# Patient Record
Sex: FEMALE | Race: WHITE | NOT HISPANIC OR LATINO | Employment: PART TIME | URBAN - METROPOLITAN AREA
[De-identification: names, ages, dates, MRNs, and addresses within clinical notes are randomized per-mention and may not be internally consistent; named-entity substitution may affect disease eponyms.]

---

## 2025-01-12 ENCOUNTER — ANCILLARY PROCEDURE (OUTPATIENT)
Dept: URGENT CARE | Age: 35
End: 2025-01-12
Payer: COMMERCIAL

## 2025-01-12 ENCOUNTER — OFFICE VISIT (OUTPATIENT)
Dept: URGENT CARE | Age: 35
End: 2025-01-12
Payer: COMMERCIAL

## 2025-01-12 VITALS
HEART RATE: 100 BPM | RESPIRATION RATE: 16 BRPM | TEMPERATURE: 102.5 F | DIASTOLIC BLOOD PRESSURE: 89 MMHG | SYSTOLIC BLOOD PRESSURE: 126 MMHG | OXYGEN SATURATION: 98 %

## 2025-01-12 DIAGNOSIS — J18.9 PNEUMONIA OF RIGHT LOWER LOBE DUE TO INFECTIOUS ORGANISM: Primary | ICD-10-CM

## 2025-01-12 DIAGNOSIS — J06.9 VIRAL URI: ICD-10-CM

## 2025-01-12 DIAGNOSIS — R05.1 ACUTE COUGH: ICD-10-CM

## 2025-01-12 DIAGNOSIS — R68.89 FLU-LIKE SYMPTOMS: ICD-10-CM

## 2025-01-12 LAB
POC RAPID INFLUENZA A: NEGATIVE
POC RAPID INFLUENZA B: NEGATIVE
POC SARS-COV-2 AG BINAX: NORMAL

## 2025-01-12 PROCEDURE — 71046 X-RAY EXAM CHEST 2 VIEWS: CPT | Performed by: PHYSICIAN ASSISTANT

## 2025-01-12 PROCEDURE — 99213 OFFICE O/P EST LOW 20 MIN: CPT | Performed by: PHYSICIAN ASSISTANT

## 2025-01-12 PROCEDURE — 87811 SARS-COV-2 COVID19 W/OPTIC: CPT | Performed by: PHYSICIAN ASSISTANT

## 2025-01-12 PROCEDURE — 87804 INFLUENZA ASSAY W/OPTIC: CPT | Performed by: PHYSICIAN ASSISTANT

## 2025-01-12 RX ORDER — AZITHROMYCIN 250 MG/1
TABLET, FILM COATED ORAL
Qty: 6 TABLET | Refills: 0 | Status: SHIPPED | OUTPATIENT
Start: 2025-01-12 | End: 2025-01-17

## 2025-01-12 RX ORDER — AMOXICILLIN AND CLAVULANATE POTASSIUM 875; 125 MG/1; MG/1
1 TABLET, FILM COATED ORAL 2 TIMES DAILY
Qty: 14 TABLET | Refills: 0 | Status: SHIPPED | OUTPATIENT
Start: 2025-01-12 | End: 2025-01-19

## 2025-01-12 NOTE — PATIENT INSTRUCTIONS
"Pneumonia       plan:  1. Discharge home.  2. Take medication as directed: Augmentin  Azithromycin (Zithromax) 250 mg tablet  Take 2 tablets on day 1, followed by 1 tablet on days 2-5  3. Follow up with your family doctor in 2-3 days for reevaluation.  4. Return to ED immediately if symptoms worsen or if new symptoms develop.    Supportive care  Supportive care includes fluids, antipyretics, analgesics, and, for patients with hypoxemia, oxygen.       Pneumonia:    Community-acquired pneumonia is lung infection that develops in people outside a hospital.   ° Many bacteria, viruses, and fungi can cause pneumonia.  ° The most common symptom of pneumonia is a cough that produces sputum, but chest pain, chills, fever, and shortness of breath are also common.  ° Doctors diagnose community-acquired pneumonia by listening to the lungs with a stethoscope and by reading x-rays or computed tomography (CT) scans of the chest.  ° Antibiotics, antiviral drugs, or antifungal drugs are used depending on which organism doctors believe has caused the pneumonia.      Causes   Many organisms cause community-acquired pneumonia, including bacteria, viruses, fungi, and parasites. In most cases, the specific microorganism causing the pneumonia is not identified. However, doctors can usually predict which microorganisms are most likely to be causing the pneumonia based on the person´s age and other factors, such as whether the person also has other diseases. The term community-acquired pneumonia is usually reserved for people who have pneumonia caused by one of the more common bacteria or viruses.    \"Walking pneumonia\" is a nonmedical term used to describe a mild case of community-acquired pneumonia that does not require bedrest or hospitalization. Some people even feel well enough to go to work and participate in other daily activities. The cause is often a viral lung infection or a bacterial infection with Mycoplasma pneumoniae or " Chlamydophila pneumoniae.    Symptoms of community-acquired pneumonia includ  ° A general feeling of weakness (malaise)  ° Cough  ° Shortness of breath  ° Fever  ° Chills  ° Chest pain      Cough typically produces sputum (thick or discolored mucus) in older children and adults, but the cough is dry in infants, young children, and older people. Shortness of breath usually is mild and occurs mainly during activity. Chest pain is typically worse when breathing in or coughing. Sometimes people have upper abdominal pain.     Symptoms vary at the extremes of age. Symptoms of pneumonia in infants and toddlers may include irritability and restlessness. Older people may be confused or have a decreased level of consciousness. Older people and young children may be unable to communicate chest pain and shortness of breath. Fever is common but may not occur in older people.    Diagnosis   ° A doctor's examination  ° Usually a chest x-ray or computed tomography (CT) scan of the chest      No matter what type of pneumonia is suspected, doctors listen to a person´s chest with a stethoscope to make a diagnosis. Chest x-rays or CT scans are usually also done to confirm the diagnosis.     Doctors usually do not need to do additional tests to determine what organism is causing the pneumonia, because most organisms are effectively treated by standard treatment and identifying the organism does not make a difference in treatment success. However, if doctors do need to identify the organism, they usually try to grow the organism from a specimen of sputum, blood, or urine. Even when such testing is done, the organism is identified less than half the time.     Sometimes the person's symptoms or risk factors may suggest the cause of the pneumonia. For example, a bird fancier may have psittacosis. Certain combinations of risk factors and symptoms may suggest Legionella infection. In people who have first had symptoms typical of influenza or  of chickenpox, most pneumonia that develops is actually a bacterial pneumonia that took root in the lungs inflamed by the virus. However, sometimes influenza and chickenpox cause pneumonia directly.    Prognosis   Most people with community-acquired pneumonia recover. However, pneumonia can be fatal, most often in infants and in older people. The death rate is higher in Legionella infections, possibly because people who develop the disease are less healthy even before they become sick.    Prevention   Stopping smoking is the best way to prevent pneumonia.    Some pneumonias can be prevented by vaccination. Unvaccinated people who are at high risk of severe pneumonia and who are in close contact with a person who has influenza can be given the antiviral drugs oseltamivir or zanamivir to prevent influenza.    Treatment   ° Antibiotics  ° Sometimes antiviral drugs or antifungal drugs      Doctors evaluate many factors to determine whether people can be safely treated at home or whether they should be hospitalized because of high risk of complications. Some of the factors include the following:    ° Age  ° Whether another disorder, such as cancer or a liver, heart, or lung disorder, is also present  ° Whether there are worrisome findings on physical examination or testing  ° Whether people are able to care for themselves or have someone to help them      Antibiotics are started as soon as possible. Doctors may prescribe antibiotics based on the severity of the infection and the risk of complications (see table How Is Community-Acquired Pneumonia Treated?). People are also given fluids, drugs to relieve fever and pain, and oxygen if needed.     Because the causative organism is difficult to identify, doctors choose antibiotics based on the organisms that are most likely to be causing pneumonia and the severity of illness.     With antibiotic treatment, most people with bacterial pneumonia improve. In people who do not  improve, doctors look for an alternative diagnosis, unusual organisms, resistance to the antibiotic used for treatment, infection with a second organism, spread of the infection beyond the lung (for example, into the lining of the lung [pleura]), or some other disorder (such as a problem with the immune system or a blocked (obstructed) airway) that is delaying recovery.     To treat influenza pneumonia, the antiviral drugs oseltamivir or zanamivir can be given. To treat chickenpox pneumonia, acyclovir is given. If a person with a presumed viral pneumonia is very sick or does not improve within a few days after beginning treatment, doctors may prescribe antibiotics in case bacteria have also infected the lung.          Pediatric pneumonia:    1-Mild/moderate community-acquired pneumonia suspected bacterial in origin  2-Consider inpatient therapy if respiratory distress, hypoxemia, and adequate follow-up, not tolerating oral therapy    First-line therapy-amoxicillin 90 mg/kg/day divided twice daily (max: 2000 mg/dose)  >5 years old consider adding coverage for atypical pneumonia.  Azithromycin 10 mg/kg/day 1 (max 500 mg/dose) followed by 5 mg/kg days 2-5 (max to 50 mg/dose)    Second line or failure-Augmentin-90 mg/kg/day divided twice daily (max 2000 mg/dose)    Beta-lactam allergy:  First-line-clindamycin 40 mg/kg/day divided 3 times daily (max 600 mg/dose) x10 days  Second line-clindamycin plus either  Cefpodoxime 10 mg/kilogram/day divided twice daily(max: 200 mg/dose) or cefixime 8 mg/kilogram divided twice daily(max 400 mg/dose)    Influenza pneumonia:  Oseltamivir-5 days course for children>12 months  <15 kilogram: 30 mg twice daily  >15 to 23 k mg twice daily  > 23 to 40 k mg twice daily  > 40 k mg twice daily    For children<12 months  1-8 months: 3 mg/kg per dose twice daily  9-11 months: 3.5 mg/kg per dose twice daily

## 2025-01-12 NOTE — PROGRESS NOTES
Subjective   Patient ID: Alisa Royal is a 34 y.o. female. They present today with a chief complaint of Cough and Fever (X 3 days).    CC: Viral symptoms x 3 days    HPI: This is a 34-year-old female patient presenting for viral symptoms.  Symptoms include fever, chills, myalgias and a dry cough.   No chest pain, shortness of breath, abdominal pain, nausea, vomiting.  No history of clots or clotting disorders.  No lower extremity swelling or pain.    Past Medical History  Allergies as of 01/12/2025    (No Known Allergies)       (Not in a hospital admission)       Past Medical History:   Diagnosis Date    Personal history of other diseases of the female genital tract     History of ovarian cyst       Past Surgical History:   Procedure Laterality Date    OTHER SURGICAL HISTORY  12/16/2022    Dilation and curettage            Review of Systems  Review of Systems    After reviewing all body systems I have documented pertinent findings above in the history.  All other Systems reviewed and are negative for complaint.  Pertinent positive and negatives are listed in the above HPI.    Objective    Vitals:    01/12/25 0909   BP: 126/89   Pulse: 100   Resp: 16   Temp: (!) 39.2 °C (102.5 °F)   SpO2: 98%     No LMP recorded.  Physical Exam    General: Alert, oriented, and cooperative.  No acute distress. Well developed, well nourished.     Skin: Skin is warm, and dry. No rashes or lesions.    Eyes: Sclera and conjunctivae normal     Ears: TM´s are intact, grey, with mild effusions bilaterally.  No significant erythema.  No hemotympanum or rupture. Bilateral auricle, helix, and tragus without inflammation or erythema.  No mastoid erythema, or tenderness.  No deformities. Right and left canal negative for erythema, or discharge.  Hearing is grossly intact bilaterally    Mouth/Throat: Pharynx is erythematous.  Tonsils are equal in size.  No exudates.  No angioedema of the lips or tongue.  No respiratory compromise, tripoding,  drooling, muffled voice,  stridor, or trismus.     Neck: Supple.     Cardiac: Regular rate and rhythm    Respiratory:  No acute respiratory distress.  Regular rate of breathing.  No accessory muscle use.  No tripoding.  Lungs are clear bilaterally.    Abdomen: Soft, nontender.    Musculoskeletal: Upper and lower extremities are atraumatic in appearance without deformity, erythema, edema, and atrophy. No crepitus, or tenderness. Compartments are all soft.      Procedures    Point of Care Test & Imaging Results from this visit  Results for orders placed or performed in visit on 01/12/25   POCT Covid-19 Rapid Antigen    Collection Time: 01/12/25  9:10 AM   Result Value Ref Range    POC JONAS-COV-2 AG  Presumptive negative test for SARS-CoV-2 (no antigen detected)     Presumptive negative test for SARS-CoV-2 (no antigen detected)   POCT Influenza A/B manually resulted    Collection Time: 01/12/25  9:39 AM   Result Value Ref Range    POC Rapid Influenza A Negative Negative    POC Rapid Influenza B Negative Negative     XR chest 2 views    Result Date: 1/12/2025  Interpreted By:  Magalys Huertas, STUDY: XR CHEST 2 VIEWS; 1/12/2025 9:51 am   INDICATION: Signs/Symptoms:cough.   COMPARISON: None.   ACCESSION NUMBER(S): [id number]   ORDERING CLINICIAN: BRUCE TAYLOR   FINDINGS: Cardiac silhouette and mediastinum within normal limits. There is round area of opacification projecting over the right lung base suggestive of developing infiltrate/pneumonia. The remainder of the lung fields are clear bilaterally. No effusions or pneumothorax.       1. Right lower lobe pneumonia. Clinical correlation and follow-up recommended to document resolution. If there is clinical concern for acute pulmonary embolic disease, further evaluation with chest CT should be considered.     Signed by: Magalys Huertas 1/12/2025 10:31 AM Dictation workstation:   [id number]     Diagnostic study results (if any) were reviewed by Bruce Taylor  JOSE.    Assessment/Plan   Allergies, medications, history, and pertinent labs/EKGs/Imaging reviewed by Bony Lyle PA-C.     MDM:  Patient presenting for flulike type symptoms.   No reported orthostasis, dizziness, hypotension, mental status change, lethargy, confusion, difficulty in arousing or cyanosis.    Rapid flu/COVID: Negative  Chest x-ray: Right lower lobe pneumonia    Patient is alert and oriented to person, place, time, situation. No acute distress or signs of respiratory compromise.  No tripoding.  No sensory muscle use.  Speaks in complete sentences.  Oral exam is benign. No drooling, angioedema, or lesions. Neck supple. Rhythm regular. No JVD or lower extremity swelling.  No red flags or clinical signs to suggest PE or CHF. No history or concerns for COPD.     Patient does not appear toxic, is <65 years old, without comorbid illness. O2 saturation within normal limits. Pt/family reports good follow up. Patient/family counseled regarding the diagnosis and will be treated as an outpatient with prescribed empiric antibiotic coverage for pneumonia. Pt discharged home d/t good condition.  Pt/family instructed to f/u with PCP within 2-3 days and encouraged/advised to return to ED if symptoms worsen or if new symptoms develop. Pt/family expressed understanding and consented to the above plan. No barriers of communication were apparent and I answered all questions.       Diagnoses  Diagnoses and all orders for this visit:  Pneumonia of right lower lobe due to infectious organism  -     amoxicillin-pot clavulanate (Augmentin) 875-125 mg tablet; Take 1 tablet by mouth 2 times a day for 7 days.  -     azithromycin (Zithromax) 250 mg tablet; Take 2 tabs (500 mg) by mouth today, than 1 daily for 4 days.  Acute cough  -     POCT Covid-19 Rapid Antigen  -     POCT Influenza A/B manually resulted  -     XR chest 2 views; Future  Flu-like symptoms      Medical Admin Record      Patient disposition:  Home    Electronically signed by Bony Lyle PA-C  10:40 AM

## 2025-01-15 ENCOUNTER — TELEPHONE (OUTPATIENT)
Facility: CLINIC | Age: 35
End: 2025-01-15

## 2025-01-15 NOTE — TELEPHONE ENCOUNTER
Per Dr. Delgado continue current treatment,take abx with food , appt if no improvement in 24 hours or ER for worsening sxs.

## 2025-01-15 NOTE — TELEPHONE ENCOUNTER
Patient is not getting better from her pneumonia lower right lobe.  Fritz was diagnosed on 01/12/2025 at an urgent care.  Her  said she is nauseated and she thinks she is going to vomit.  She is taking her antibiotics and her fever is down since she has been taking her meds.  She would like to come in today.

## 2025-03-06 ENCOUNTER — APPOINTMENT (OUTPATIENT)
Dept: OPHTHALMOLOGY | Facility: CLINIC | Age: 35
End: 2025-03-06
Payer: COMMERCIAL

## 2025-03-06 DIAGNOSIS — H02.432 PARALYTIC PTOSIS OF LEFT UPPER EYELID: ICD-10-CM

## 2025-03-06 DIAGNOSIS — H02.431: Primary | ICD-10-CM

## 2025-03-06 DIAGNOSIS — H02.403 PTOSIS OF EYELID, BILATERAL: ICD-10-CM

## 2025-03-06 DIAGNOSIS — H46.9 OPTIC NEUROPATHY, LEFT: ICD-10-CM

## 2025-03-06 PROCEDURE — 99204 OFFICE O/P NEW MOD 45 MIN: CPT

## 2025-03-06 PROCEDURE — 92081 LIMITED VISUAL FIELD XM: CPT

## 2025-03-06 ASSESSMENT — CONF VISUAL FIELD
OD_NORMAL: 1
OD_SUPERIOR_NASAL_RESTRICTION: 0
OD_INFERIOR_NASAL_RESTRICTION: 0
OS_INFERIOR_TEMPORAL_RESTRICTION: 0
OS_NORMAL: 1
OS_INFERIOR_NASAL_RESTRICTION: 0
OS_SUPERIOR_TEMPORAL_RESTRICTION: 0
OD_SUPERIOR_TEMPORAL_RESTRICTION: 0
OD_INFERIOR_TEMPORAL_RESTRICTION: 0
OS_SUPERIOR_NASAL_RESTRICTION: 0

## 2025-03-06 ASSESSMENT — VISUAL ACUITY
OS_CC: 20/20
OD_CC: 20/20
CORRECTION_TYPE: CONTACTS
OD_CC+: -2
METHOD: SNELLEN - LINEAR

## 2025-03-06 ASSESSMENT — TONOMETRY: IOP_METHOD: GOLDMANN APPLANATION

## 2025-03-06 ASSESSMENT — SLIT LAMP EXAM - LIDS
COMMENTS: NORMAL
COMMENTS: NORMAL

## 2025-03-06 ASSESSMENT — ENCOUNTER SYMPTOMS
EYES NEGATIVE: 1
ENDOCRINE NEGATIVE: 0
GASTROINTESTINAL NEGATIVE: 0
CARDIOVASCULAR NEGATIVE: 0
CONSTITUTIONAL NEGATIVE: 0
HEMATOLOGIC/LYMPHATIC NEGATIVE: 0
MUSCULOSKELETAL NEGATIVE: 0
ALLERGIC/IMMUNOLOGIC NEGATIVE: 0
NEUROLOGICAL NEGATIVE: 0
RESPIRATORY NEGATIVE: 0
PSYCHIATRIC NEGATIVE: 0

## 2025-03-06 ASSESSMENT — PUNCTA - ASSESSMENT
OS_PUNCTA: NORMAL
OD_PUNCTA: NORMAL

## 2025-03-06 ASSESSMENT — CUP TO DISC RATIO
OD_RATIO: .15
OS_RATIO: .15

## 2025-03-06 ASSESSMENT — EXTERNAL EXAM - RIGHT EYE: OD_EXAM: NORMAL

## 2025-03-06 ASSESSMENT — EXTERNAL EXAM - LEFT EYE: OS_EXAM: NORMAL

## 2025-03-06 NOTE — PROGRESS NOTES
"Alisa Royal is a 34 y.o. female presenting with complaints of droopy upper eyelids  Reports experiencing longstanding eyelid drooping that has possibly worsened slowly overtime. CL user (toric OD nontoric OS SCLs) since age 12.   Symptoms include   - Not bothered by cosmetic appearance as much as bothered by seeing streaks (sounds like glare when discussed further) with light at nighttime (has been this way for 10-15 years). No afterimages. Glare does not change with or without CL. The glare does however improve with manual lid elevation.   - Dimmer vision is bothersome to her. Improves with manual lid  elevation.  Obscured superior visual field  Heaviness of eyes  Difficulty keeping eyes open  Frequent need to manually lift eyelids  Denies   Trauma  Prior periocular surgeries, injectables, and laser  Denies   Diplopia  Vision loss  Denies dysarthria, dysphonia, dysphagia, SOB, muscular weakness  Smoking status: no        Pertinent Exam Findings  Blepharoptosis of the OD>OS upper eyelid  MRD1 -2 mm OD, -1 mm OS  PF 3 mm OD, 4 mm  OS  LF 10 mm OD, 12 mm OS  SC 11 mm OD, 11 mm OS  EOM full OU; orthophoric; PERRL with no APD and no anisocoria  Brow position good  Phenylephrine testing:  MRD1 improves to -1.5 mm OD, -0.5 mm OS     Ptosis VF Testing  There is a significant obstruction in both eyes, which improves significantly in both eyes after the eyelids are taped up.     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 VA 20/20 OU; PERRL with no anisocoria, EOM full OU, no diplopia, ortho. No pigmentary retinopathy or history of cardiac issues.    Etiologies include levator dehiscence due to CTL-wear and also neurogenic/myogenic causes due to reduced LF OU. Notably, the pt reports her father has a similar issue with ptosis and \"weak eyelids\", although he does not have cardiac issues. Denies bulbar symptoms and diplopia.    If correction desired, pt is a " candidate for bilateral upper eyelid ELR. However, emphasized that due to weakened LF OU, the ptosis may recur with time and additional repair may be necessary in the future, which may need to include frontalis sling surgery (though at this time, ELR is feasible as LF is not too low).    Before proceeding with surgery, workup for neurogenic causes is warranted. This will include labs for MG, creatine kinase and lactate levels, and genetic testing for CPEO and related conditions.    Incidental finding of left optic neuropathy noted today (2+ APD OS, CP 8/8 OU, slight temporal pallor of left optic nerve without edema or heme). Recommended laboratory workup for optic neuropathy, MRI ANGELES WWO, and neuro-ophthalmic evaluation with Dr. Jones.

## 2025-03-06 NOTE — PATIENT INSTRUCTIONS
"Your Doctor Has Ordered a CT or MRI Scan  The imaging department will contact you to schedule the appointment. However, you may also contact them directly at [phone] to schedule your appointment. Please have your scan done before your next appointment with us. If you have your scan done outside of a  facility, please ask for the \"disk\" or \"USB\" and bring that to your next appointment.   "

## 2025-03-18 ENCOUNTER — APPOINTMENT (OUTPATIENT)
Dept: RADIOLOGY | Facility: HOSPITAL | Age: 35
End: 2025-03-18
Payer: COMMERCIAL

## 2025-03-19 ENCOUNTER — APPOINTMENT (OUTPATIENT)
Dept: DERMATOLOGY | Facility: CLINIC | Age: 35
End: 2025-03-19
Payer: COMMERCIAL

## 2025-03-31 ENCOUNTER — APPOINTMENT (OUTPATIENT)
Dept: RADIOLOGY | Facility: HOSPITAL | Age: 35
End: 2025-03-31
Payer: COMMERCIAL

## 2025-03-31 ENCOUNTER — HOSPITAL ENCOUNTER (OUTPATIENT)
Dept: RADIOLOGY | Facility: HOSPITAL | Age: 35
Discharge: HOME | End: 2025-03-31
Payer: COMMERCIAL

## 2025-03-31 VITALS — WEIGHT: 169.97 LBS | BODY MASS INDEX: 29.18 KG/M2

## 2025-03-31 DIAGNOSIS — H02.403 PTOSIS OF EYELID, BILATERAL: ICD-10-CM

## 2025-03-31 DIAGNOSIS — H02.432 PARALYTIC PTOSIS OF LEFT UPPER EYELID: ICD-10-CM

## 2025-03-31 DIAGNOSIS — H02.431: ICD-10-CM

## 2025-03-31 PROCEDURE — 70553 MRI BRAIN STEM W/O & W/DYE: CPT | Performed by: RADIOLOGY

## 2025-03-31 PROCEDURE — A9575 INJ GADOTERATE MEGLUMI 0.1ML: HCPCS

## 2025-03-31 PROCEDURE — 70543 MRI ORBT/FAC/NCK W/O &W/DYE: CPT

## 2025-03-31 PROCEDURE — 2550000001 HC RX 255 CONTRASTS

## 2025-03-31 PROCEDURE — 70543 MRI ORBT/FAC/NCK W/O &W/DYE: CPT | Performed by: RADIOLOGY

## 2025-03-31 PROCEDURE — 70553 MRI BRAIN STEM W/O & W/DYE: CPT

## 2025-03-31 RX ORDER — GADOTERATE MEGLUMINE 376.9 MG/ML
15 INJECTION INTRAVENOUS
Status: COMPLETED | OUTPATIENT
Start: 2025-03-31 | End: 2025-03-31

## 2025-03-31 RX ADMIN — GADOTERATE MEGLUMINE 15 ML: 376.9 INJECTION INTRAVENOUS at 11:07

## 2025-04-03 ENCOUNTER — APPOINTMENT (OUTPATIENT)
Dept: DERMATOLOGY | Facility: CLINIC | Age: 35
End: 2025-04-03
Payer: COMMERCIAL

## 2025-04-06 LAB
ACE SERPL-CCNC: 44 U/L (ref 9–67)
ACHR BIND AB SER-SCNC: <0.3 NMOL/L
ACHR BLOCK AB SER-ACNC: <15 % INHIBITION
ACHR MOD AB SER-ACNC: 20 % INHIBITION
ANA PAT SER IF-IMP: ABNORMAL
ANA SER QL IF: POSITIVE
ANA TITR SER IF: ABNORMAL TITER
ANCA AB SER QL: NEGATIVE
AQP4 H2O CHANNEL IGG SER QL CBA IFA: NEGATIVE
B BURGDOR IGG SER IA-ACNC: <=0.9 INDEX
B BURGDOR IGG+IGM SER QL IA: 1.32 INDEX
B BURGDOR IGM SER IA-ACNC: <=0.9 INDEX
BASOPHILS # BLD AUTO: 29 CELLS/UL (ref 0–200)
BASOPHILS NFR BLD AUTO: 0.5 %
CENTROMERE B AB SER-ACNC: ABNORMAL AI
CK SERPL-CCNC: 240 U/L (ref 20–239)
DSDNA AB SER-ACNC: <1 IU/ML
ENA JO1 AB SER IA-ACNC: ABNORMAL AI
ENA RNP AB SER-ACNC: ABNORMAL AI
ENA SCL70 AB SER IA-ACNC: ABNORMAL AI
ENA SM AB SER IA-ACNC: ABNORMAL AI
ENA SM+RNP AB SER IA-ACNC: ABNORMAL AI
ENA SS-A AB SER IA-ACNC: ABNORMAL AI
ENA SS-B AB SER IA-ACNC: ABNORMAL AI
EOSINOPHIL # BLD AUTO: 103 CELLS/UL (ref 15–500)
EOSINOPHIL NFR BLD AUTO: 1.8 %
ERYTHROCYTE [DISTWIDTH] IN BLOOD BY AUTOMATED COUNT: 12.7 % (ref 11–15)
HCT VFR BLD AUTO: 40 % (ref 35–45)
HGB BLD-MCNC: 13 G/DL (ref 11.7–15.5)
IGNF NEG CNTRL BLD: NORMAL
LABORATORY COMMENT REPORT: ABNORMAL
LACTATE SERPL-MCNC: 1 MMOL/L (ref 0.4–1.8)
LYMPHOCYTES # BLD AUTO: 1955 CELLS/UL (ref 850–3900)
LYMPHOCYTES NFR BLD AUTO: 34.3 %
M TB IFN-G BLD-IMP: NEGATIVE
MCH RBC QN AUTO: 30.5 PG (ref 27–33)
MCHC RBC AUTO-ENTMCNC: 32.5 G/DL (ref 32–36)
MCV RBC AUTO: 93.9 FL (ref 80–100)
METHYLMALONATE SERPL-SCNC: 90 NMOL/L (ref 55–335)
MITOGEN IGNF.SPOT COUNT BLD: NORMAL
MONOCYTES # BLD AUTO: 513 CELLS/UL (ref 200–950)
MONOCYTES NFR BLD AUTO: 9 %
MYELOPEROXIDASE AB SER IA-ACNC: <1 AI
NARRATIVE DIAGNOSTIC REPORT-IMP: NORMAL
NEUTROPHILS # BLD AUTO: 3101 CELLS/UL (ref 1500–7800)
NEUTROPHILS NFR BLD AUTO: 54.4 %
NUCLEOSOME AB SER IA-ACNC: ABNORMAL AI
PLATELET # BLD AUTO: 232 THOUSAND/UL (ref 140–400)
PMV BLD REES-ECKER: 10.5 FL (ref 7.5–12.5)
PROTEINASE3 AB SER IA-ACNC: <1 AI
QUEST PANEL A SPOT COUNT: 0
QUEST PANEL B SPOT COUNT: 0
RBC # BLD AUTO: 4.26 MILLION/UL (ref 3.8–5.1)
REF LAB TEST METHOD: NORMAL
REF LAB TEST RESULTS: NORMAL
RIBOSOMAL P AB SER-ACNC: ABNORMAL AI
SERVICE CMNT 04-IMP: NORMAL
SERVICE CMNT-IMP: NORMAL
T PALLIDUM AB SER QL IA: NEGATIVE
WBC # BLD AUTO: 5.7 THOUSAND/UL (ref 3.8–10.8)

## 2025-04-08 ENCOUNTER — APPOINTMENT (OUTPATIENT)
Dept: OPHTHALMOLOGY | Facility: CLINIC | Age: 35
End: 2025-04-08
Payer: COMMERCIAL

## 2025-04-11 ENCOUNTER — APPOINTMENT (OUTPATIENT)
Dept: RADIOLOGY | Facility: HOSPITAL | Age: 35
End: 2025-04-11
Payer: COMMERCIAL

## 2025-05-02 ENCOUNTER — APPOINTMENT (OUTPATIENT)
Dept: OPHTHALMOLOGY | Facility: CLINIC | Age: 35
End: 2025-05-02
Payer: COMMERCIAL

## 2025-05-05 ENCOUNTER — APPOINTMENT (OUTPATIENT)
Dept: OPHTHALMOLOGY | Age: 35
End: 2025-05-05
Payer: COMMERCIAL

## 2025-05-13 ENCOUNTER — APPOINTMENT (OUTPATIENT)
Dept: OPHTHALMOLOGY | Facility: CLINIC | Age: 35
End: 2025-05-13
Payer: COMMERCIAL

## 2025-05-14 ENCOUNTER — APPOINTMENT (OUTPATIENT)
Dept: OPHTHALMOLOGY | Facility: CLINIC | Age: 35
End: 2025-05-14
Payer: COMMERCIAL

## 2025-06-06 ENCOUNTER — APPOINTMENT (OUTPATIENT)
Dept: OPHTHALMOLOGY | Facility: CLINIC | Age: 35
End: 2025-06-06
Payer: COMMERCIAL

## 2025-06-30 NOTE — PROGRESS NOTES
"MEDICAL GENETICS INITIAL VISIT NOTE     Patient full name: Alisa Royal  MRN: [id number]  YOB: 1990  Present during this visit: The patient     Primary care provider: Amaris Delgado DO  Referring provider: Lashonda Perez MD (Ophthalmology)    Medical history was obtained from the patient. Prior to this appointment, I reviewed medical records from outpatient medical records and scanned documents, if available.     History of present illness:    Dear Dr. Perez:    It was a pleasure to see Ms. Royal in clinic today. Ms. Royal is a 34 y.o. female who was referred to Genetics for bilateral ptosis. She started noticing ptosis at both eyes in college. Symptoms have been slowly progressive during the past 10 years. Due to the ptosis, it is challenging to drive at night. Using contact lens. No other ocular diagnoses. Denies diplopia, oscillopsia, blurry vision. No muscle weakness or bulbar symptoms.     PMH - vertigo attack x1, PCOS   PSH - ovarian cyst sx    Review of systems:  Headaches: -  Hearing loss: -   Facial palsy: -   Strokes: -   Seizures: -   Dysphagia/aspiration: -   Cardiopulmonary symptoms: -   Sleep issues: -   Myotonia: -   Muscle weakness: -   Muscle stiffness: very occasionally   Muscle atrophy: -   Muscle hypertrophy: -   Rhabdomyolysis: -   GI issues: -   Urinary issues: -   Musculoskeletal issues: she is hypermobile. Never had a fracture. Has had joint subluxation at the R shoulder, seen by PT.   Cognitive decline: -   Gait disturbance: -  Fall: -  Paresthesia: -    Neurodevelopmental disorders (GDD, LD, LD, ID): -  Significant childhood illness: -     Social history:  Teaches music. Lives with three children and .     Family history:  Four-generation family tree was obtained and scanned to chart, under \"Genetics\" folder.  Pertinent history   Three children, healthy.   Father, 71, bilateral ptosis, scoliosis, no muscle weakness.    Paternal grandparents (d) Alzheimer's " disease     Ashkenazi Islam: Denied  Consanguinity: Denied     Physical examination:  General: Alert. No acute distress. Well-nourished.  Head and face: No significant dysmorphic craniofacial features. Palate not high-arched.   Lungs and chest wall: Clear to auscultation bilaterally. No pectus differences.  CVS: Regular rate and rhythm. No murmur.   Abdomen: No abdominal wall defect.   Extremities: Hands, feet, fingers and toes appear normal. No joint hypermobility. No pes cavus. No hindfoot deformities. No hammer toes. Not edematous. No tenderness.   Skin: No birthmarks.   Neurologic: Without ophthalmoplegia or nystagmus. No ptosis. No facial palsy. Tongue in midline. No dysarthria. Muscle power 5+ throughout. No pronator drift. No significant muscle atrophy or hypertrophy at hands, feet, and calves. No handgrip or percussion myotonia. No perioral or muscle fasciculation.  No scapular winging. Brachial and patellar reflexes 2+. Normal gait without abnormal movement or tremors. Able to perform tandem gait. Negative Romberg. Finger-to-nose intact. No dysdiadochokinesia.   Psychiatric: Cooperative. Appropriate mood and affect.    Results:  MMA, lactate, CBC - wnl  CK - 240 ()    Assessment:  Ms. Royal is a 34 y.o. female who was referred to Genetics for bilateral ptosis. Father also has bilateral ptosis.     We reviewed etiology of ptosis: genetic, non-genetic, idiopathic. Genetic differential diagnoses include mitochondrial disorders (such as PEO), several hereditary myopathies, myotonic dystrophy type 1, and OPMD. Given positive family history and the absence of other syndromic features and muscle weakness, PEO caused by a nuclear gene mutation (such as in POLG, TWNK, RRM2B) is an important diagnostic consideration. PEO caused by a single mtDNA deletion is less likely because it is almost never inherited.    I recommend a gene panel of neuromuscular genes. If non-diagnostic, whole genome sequencing could be  considered.      Benefits of having genetic test include 1) to establish a molecular diagnosis; 2) to provide further medical recommendations specific to each diagnosis; 3) for familial cascade testing, recurrence risk estimation, and family planning; and 4) to allow for participation in support group organizations and enrolment in clinical trials, if any. Pretest genetic counseling was provided, including the nature of the test; three types of test result (positive, negative, and uncertain); the fact that a negative result does not exclude a possibility of genetic disorders; retention of de-identified genetic data at the testing company; Genetic Information Non-Discrimination Act (RICHIE). The patient provided a verbal informed consent.     Plan:  -[facility] Neuromuscular [facility] panel. TAT 4 weeks. Sample: buccal swab/saliva obtained today. Insurance billing. Estimated OOP $0.   -If non-diagnostic, consider WGS; she will be qualified for the Financial Assistance Program ($150).  - Return to clinic when results are available.    Thank you for allowing me to participate in the care of this patient. Please do not hesitate to contact me if you have any questions.     Sincerely,     Claire Lopez MD    Medical Geneticist  [facility]  Phone: [phone]  Fax: [phone]   Address: [address] [place] [address]  Time spent (this information is required by insurance): face-to-face 48min; preparation 5min; documentation 20min; packaging and shipping sample 5min; total time spent 78min

## 2025-07-01 ENCOUNTER — APPOINTMENT (OUTPATIENT)
Dept: GENETICS | Facility: CLINIC | Age: 35
End: 2025-07-01
Payer: COMMERCIAL

## 2025-07-01 VITALS
HEART RATE: 82 BPM | BODY MASS INDEX: 28.68 KG/M2 | WEIGHT: 168 LBS | TEMPERATURE: 98.3 F | DIASTOLIC BLOOD PRESSURE: 79 MMHG | HEIGHT: 64 IN | SYSTOLIC BLOOD PRESSURE: 114 MMHG | RESPIRATION RATE: 18 BRPM

## 2025-07-01 DIAGNOSIS — H02.431: ICD-10-CM

## 2025-07-01 DIAGNOSIS — H02.432 PARALYTIC PTOSIS OF LEFT UPPER EYELID: ICD-10-CM

## 2025-07-01 PROCEDURE — 1036F TOBACCO NON-USER: CPT | Performed by: INTERNAL MEDICINE

## 2025-07-01 PROCEDURE — 3008F BODY MASS INDEX DOCD: CPT | Performed by: INTERNAL MEDICINE

## 2025-07-01 PROCEDURE — 99205 OFFICE O/P NEW HI 60 MIN: CPT | Performed by: INTERNAL MEDICINE

## 2025-07-01 ASSESSMENT — PATIENT HEALTH QUESTIONNAIRE - PHQ9
SUM OF ALL RESPONSES TO PHQ9 QUESTIONS 1 & 2: 0
1. LITTLE INTEREST OR PLEASURE IN DOING THINGS: NOT AT ALL
2. FEELING DOWN, DEPRESSED OR HOPELESS: NOT AT ALL

## 2025-07-01 NOTE — LETTER
07/01/25    Lashonda Perez MD  [id number]  Department Of Ophthalmology  [facility] [address]      Dear Dr. Lashonda Perez MD,    Thank you for referring your patient, Alisa Royal, to receive care in my office. I have enclosed a summary of the care provided to Alisa on 07/01/25.    Please contact me with any questions you may have regarding the visit.    Sincerely,         Claire Lopez MD  [id number]  [facility] [address]  [facility] [id number]    CC: No Recipients

## 2025-07-01 NOTE — LETTER
07/01/25    Amaris Delgado DO  [address] [facility]  [address]  [facility] [address]      Dear Dr. Amaris Delgado DO,    I am writing to confirm that your patient, Alisa Royal  received care in my office on 07/01/25. I have enclosed a summary of the care provided to Alisa for your reference.    Please contact me with any questions you may have regarding the visit.    Sincerely,         Claire Lopez MD  [address] [facility] [facility] [address]  [facility] [id number]    CC: No Recipients

## 2025-08-08 ENCOUNTER — APPOINTMENT (OUTPATIENT)
Dept: GENETICS | Facility: CLINIC | Age: 35
End: 2025-08-08
Payer: COMMERCIAL

## 2025-08-08 DIAGNOSIS — Z83.518: ICD-10-CM

## 2025-08-08 DIAGNOSIS — H02.431: ICD-10-CM

## 2025-08-08 DIAGNOSIS — H02.432 PARALYTIC PTOSIS OF LEFT UPPER EYELID: Primary | ICD-10-CM

## 2025-08-08 PROCEDURE — 99215 OFFICE O/P EST HI 40 MIN: CPT | Performed by: INTERNAL MEDICINE

## 2025-09-30 ENCOUNTER — APPOINTMENT (OUTPATIENT)
Dept: OPHTHALMOLOGY | Facility: CLINIC | Age: 35
End: 2025-09-30
Payer: COMMERCIAL

## 2025-10-02 ENCOUNTER — APPOINTMENT (OUTPATIENT)
Dept: OPHTHALMOLOGY | Facility: CLINIC | Age: 35
End: 2025-10-02
Payer: COMMERCIAL

## 2025-10-03 ENCOUNTER — APPOINTMENT (OUTPATIENT)
Dept: GENETICS | Facility: CLINIC | Age: 35
End: 2025-10-03
Payer: COMMERCIAL

## 2025-10-21 ENCOUNTER — APPOINTMENT (OUTPATIENT)
Dept: DERMATOLOGY | Facility: CLINIC | Age: 35
End: 2025-10-21